# Patient Record
Sex: MALE
[De-identification: names, ages, dates, MRNs, and addresses within clinical notes are randomized per-mention and may not be internally consistent; named-entity substitution may affect disease eponyms.]

---

## 2024-08-16 PROBLEM — Z00.00 ENCOUNTER FOR PREVENTIVE HEALTH EXAMINATION: Status: ACTIVE | Noted: 2024-08-16

## 2024-08-19 ENCOUNTER — APPOINTMENT (OUTPATIENT)
Dept: COLORECTAL SURGERY | Facility: CLINIC | Age: 44
End: 2024-08-19
Payer: COMMERCIAL

## 2024-08-19 VITALS
HEART RATE: 64 BPM | WEIGHT: 169 LBS | BODY MASS INDEX: 22.89 KG/M2 | HEIGHT: 72 IN | DIASTOLIC BLOOD PRESSURE: 85 MMHG | SYSTOLIC BLOOD PRESSURE: 127 MMHG | OXYGEN SATURATION: 98 %

## 2024-08-19 DIAGNOSIS — Z82.49 FAMILY HISTORY OF ISCHEMIC HEART DISEASE AND OTHER DISEASES OF THE CIRCULATORY SYSTEM: ICD-10-CM

## 2024-08-19 DIAGNOSIS — K64.9 UNSPECIFIED HEMORRHOIDS: ICD-10-CM

## 2024-08-19 DIAGNOSIS — Z78.9 OTHER SPECIFIED HEALTH STATUS: ICD-10-CM

## 2024-08-19 DIAGNOSIS — Z83.3 FAMILY HISTORY OF DIABETES MELLITUS: ICD-10-CM

## 2024-08-19 PROCEDURE — 46600 DIAGNOSTIC ANOSCOPY SPX: CPT

## 2024-08-19 PROCEDURE — 99203 OFFICE O/P NEW LOW 30 MIN: CPT | Mod: 25

## 2024-08-19 RX ORDER — HYDROCORTISONE 25 MG/G
2.5 CREAM TOPICAL
Qty: 1 | Refills: 3 | Status: ACTIVE | COMMUNITY
Start: 2024-08-19 | End: 1900-01-01

## 2024-08-19 NOTE — HISTORY OF PRESENT ILLNESS
[FreeTextEntry1] : 43yo male presents for initial evaluation  Reason for visit - hemorrhoids Referred by GI Dr Ba Camilo  Denies PMH Denies taking medications Denies PSH  In fall 2023, patient had discomfort after moving bowels. Burning sensation. Occasional BRB on TP with wiping after BM.  Saw PCP who referred him to GI in same office.  GI wanted to do a colonoscopy. Patient reports he had colonoscopy October 2023 and states after he was told by GI that he had internal hemorrhoids and referred him to see a proctologist.   BMs are daily to every other day. Denies taking stool softeners or laxatives. Denies pushing or straining.   Has never used any creams or suppositories.

## 2024-08-19 NOTE — ASSESSMENT
[FreeTextEntry1] : Exam findings and diagnosis were discussed at length with patient.  Recommendations including increased fiber intake, adequate daily hydration, stool softeners as needed, and sitz baths as needed and after bowel movements were discussed. Avoid constipation and diarrhea, avoid pushing/straining. Medical management, such as preparation H or hydrocortisone cream/suppositories, was discussed as needed for symptoms. Office based treatment and surgery were discussed alternatives if symptoms persist or worsen despite conservative management. All questions answered, patient expressed understanding and is agreeable to this plan.

## 2024-08-19 NOTE — PHYSICAL EXAM
[FreeTextEntry1] : Medical assistant present for duration of physical examination   General no acute distress, alert and oriented Psych calm, pleasant demeanor, responding appropriately to questions Nonlabored breathing Ambulating without assistance Skin normal color and pigment, no visible lesions or rashes   Anorectal Exam: Inspection no erythema, induration or fluctuance, no skin excoriation, no fissure, minimal external hemorrhoid tissue without acute inflammation, thrombosis or tag formation LUIS nontender, no masses palpated, no blood on gloved finger   Procedure: Anoscopy   Pre procedure Diagnosis: hemorrhoids Post procedure Diagnosis: hemorrhoids Anesthesia: none Estimated blood loss: none Specimen: none Complications: none   Consent obtained. Anoscopy was performed by passing a lighted anoscope with lubricant jelly into the anal canal and the entire anal mucosal surface was inspected. Findings included no fissure, small mild internal hemorrhoids without acute inflammation, no visible masses or lesions in anal canal   Patient tolerated examination and procedure well.